# Patient Record
Sex: FEMALE | HISPANIC OR LATINO | Employment: FULL TIME | URBAN - METROPOLITAN AREA
[De-identification: names, ages, dates, MRNs, and addresses within clinical notes are randomized per-mention and may not be internally consistent; named-entity substitution may affect disease eponyms.]

---

## 2024-05-07 ENCOUNTER — OCCUPATIONAL MEDICINE (OUTPATIENT)
Dept: URGENT CARE | Facility: CLINIC | Age: 49
End: 2024-05-07
Payer: COMMERCIAL

## 2024-05-07 ENCOUNTER — HOSPITAL ENCOUNTER (EMERGENCY)
Facility: MEDICAL CENTER | Age: 49
End: 2024-05-07
Attending: EMERGENCY MEDICINE
Payer: COMMERCIAL

## 2024-05-07 VITALS
TEMPERATURE: 97.9 F | WEIGHT: 180 LBS | DIASTOLIC BLOOD PRESSURE: 70 MMHG | OXYGEN SATURATION: 98 % | RESPIRATION RATE: 12 BRPM | HEART RATE: 83 BPM | SYSTOLIC BLOOD PRESSURE: 122 MMHG

## 2024-05-07 VITALS
SYSTOLIC BLOOD PRESSURE: 115 MMHG | OXYGEN SATURATION: 98 % | TEMPERATURE: 96.8 F | BODY MASS INDEX: 28.18 KG/M2 | HEART RATE: 88 BPM | DIASTOLIC BLOOD PRESSURE: 77 MMHG | HEIGHT: 61 IN | WEIGHT: 149.25 LBS | RESPIRATION RATE: 18 BRPM

## 2024-05-07 DIAGNOSIS — R42 DIZZINESS: ICD-10-CM

## 2024-05-07 DIAGNOSIS — S06.9X9A HEAD INJURY WITH LOSS OF CONSCIOUSNESS (HCC): ICD-10-CM

## 2024-05-07 DIAGNOSIS — S06.0X1A CONCUSSION WITH LOSS OF CONSCIOUSNESS OF 30 MINUTES OR LESS, INITIAL ENCOUNTER: ICD-10-CM
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
## 2024-05-07 DIAGNOSIS — S16.1XXA STRAIN OF NECK MUSCLE, INITIAL ENCOUNTER: ICD-10-CM

## 2024-05-07 PROCEDURE — 3074F SYST BP LT 130 MM HG: CPT | Performed by: NURSE PRACTITIONER

## 2024-05-07 PROCEDURE — 99282 EMERGENCY DEPT VISIT SF MDM: CPT

## 2024-05-07 PROCEDURE — 99205 OFFICE O/P NEW HI 60 MIN: CPT | Performed by: NURSE PRACTITIONER

## 2024-05-07 PROCEDURE — 3078F DIAST BP <80 MM HG: CPT | Performed by: NURSE PRACTITIONER

## 2024-05-07 ASSESSMENT — ENCOUNTER SYMPTOMS
HEADACHES: 1
NECK PAIN: 1
DIZZINESS: 1
BLURRED VISION: 0
LOSS OF CONSCIOUSNESS: 1
NAUSEA: 1

## 2024-05-07 ASSESSMENT — PAIN DESCRIPTION - PAIN TYPE: TYPE: ACUTE PAIN

## 2024-05-08 NOTE — ED PROVIDER NOTES
ED Provider Note    Scribed for Dr. Mohamud by Junior Barajas. 5/7/2024,  8:06 PM.      CHIEF COMPLAINT  Chief Complaint   Patient presents with    T-5000 FALL     Was pulling a cable at work around 0830 today. , fell on back and hit head on on concrete. LOC for unknown amount of time. Denies thinners or vomiting. Feels nauseated. Seen a occupational health and sen to ER for further work up. Now complaining of dizziness and neck pain and numbness on both shoulder. Drove self to ER.        EXTERNAL RECORDS REVIEWED  Outpatient Notes Patient was seen at Occupational Health earlier today. Referred to come to the ED for further evaluation and higher level of care.    HPI  LIMITATION TO HISTORY   Select: Language Scottish,  Used   OUTSIDE HISTORIAN(S):  None    Alyssa Bullock is a 48 y.o. female who presents to the Emergency Department for a ground level fall onset this morning around 8:30 AM. The patient reports she was pulling a cable at work and fell onto her back, hitting her head against the concrete. Patient was seen by occupational health and was sent to the ED for further evaluation. She reports experiencing head pain, dizziness, and neck pain. Patient had nausea this morning after the incident but denies currently experiencing nausea. She reports noticing these symptoms after taking a nap after work. She reports loss of consciousness for an unknown amount of time. She denies being on blood thinners and also denies any vomiting, back pain, or vision changes.      REVIEW OF SYSTEMS  See HPI for further details. All other systems are negative.     PAST MEDICAL HISTORY   No past medical history noted.    SURGICAL HISTORY  No past surgical history noted.    FAMILY HISTORY  No family history noted.    SOCIAL HISTORY    reports that she has never smoked. She does not have any smokeless tobacco history on file. She reports that she does not drink alcohol and does not use drugs.    CURRENT  "MEDICATIONS  Home Medications       Reviewed by Chika Nelson R.N. (Registered Nurse) on 05/07/24 at 1943  Med List Status: Partial     Medication Last Dose Status        Patient Juan Taking any Medications                           ALLERGIES  No Known Allergies    PHYSICAL EXAM  VITAL SIGNS: /77   Pulse 94   Temp 35.9 °C (96.7 °F) (Temporal)   Ht 1.56 m (5' 1.42\")   Wt 67.7 kg (149 lb 4 oz)   LMP 10/18/2023 (Approximate)   SpO2 97%   BMI 27.82 kg/m²   Gen: Alert, no acute distress  HEENT: ATNC  Eyes: PERRL, EOMI, normal conjunctiva  Neck: trachea midline, bilateral paraspinal C-spine tenderness, full ROM of neck  Resp: no respiratory distress  CV: No JVD, regular rate and rhythm  Abd: non-distended  Back: no midline spinal tenderness  Ext: No deformities  Neuro: speech fluent, cranial nerves II-XII intact, GCS 15    COURSE & MEDICAL DECISION MAKING  Pertinent Labs & Imaging studies were reviewed. (See chart for details)  -----------    8:06 PM Patient seen and examined at bedside. I discussed the patient likely has a concussion and does not require additional imaging at this time. Patient had the opportunity to ask any questions. The plan for discharge was discussed with them and they were told to return for any new or worsening symptoms. She was also informed of the plans for follow up. Patient is understanding and agreeable to the plan for discharge.    INITIAL ASSESSMENT AND PLAN  Medical Decision Making: Patient presents approximate 12 hours after a nonsyncopal fall.  She does report temporary loss of consciousness followed by some nausea which has resolved.  She does have an ongoing headache but is not showing any signs of clinically significant traumatic brain injury.  Her neck pain is negative according to [place] C-spine rules.  She has full range of motion of the neck.  No evidence of unstable cervical injury, or surgical intracranial pathology.  Patient is not on any blood thinners.  " Patient was given anticipatory guidance for her concussion    ADDITIONAL PROBLEM LIST AND DISPOSITION      Escalation of care considered, and ultimately not performed: diagnostic imaging.     Barriers to care at this time, including but not limited to: Patient does not have established PCP.     Decision tools and prescription drugs considered including, but not limited to:  [place] cervical spine rules: Negative .    The patient will return for new or worsening symptoms and is stable at the time of discharge.    The patient is referred to a primary physician for blood pressure management, diabetic screening, and for all other preventative health concerns.    DISPOSITION:  Patient will be discharged home in stable condition.    FOLLOW UP:  [facility], Emergency Dept  [facility] [facility] [id number] [phone]    If symptoms worsen    Your occupational health provider    Schedule an appointment as soon as possible for a visit   If you still have symptoms tomorrow morning    FINAL IMPRESSION  1. Fall, initial encounter    2. Concussion with loss of consciousness of 30 minutes or less, initial encounter    3. Strain of neck muscle, initial encounter      IJunior (Berny), am scribing for, and in the presence of, Blake Mohamud M.D..    Electronically signed by: Junior Barajas (Berny), 5/7/2024    IBlake M.D. personally performed the services described in this documentation, as scribed by Junior Barajas in my presence, and it is both accurate and complete.    The note accurately reflects work and decisions made by me.  Blake Mohamud M.D.  5/7/2024  8:44 PM      This dictation was created using voice recognition software. The accuracy of the dictation is limited to the abilities of the software. I expect there may be some errors of grammar and possibly content. The nursing notes were reviewed and certain aspects of this information were incorporated into this note.

## 2024-05-08 NOTE — ED NOTES
"Pt discharged home, pt A&Ox4, on room air, steady gait. pt in possession of belongings. Pt provided discharge education and information pertaining to medications and follow up appointments. Pt received copy of discharge instructions and verbalized understanding. /77   Pulse 88   Temp 36 °C (96.8 °F) (Temporal)   Resp 18   Ht 1.56 m (5' 1.42\")   Wt 67.7 kg (149 lb 4 oz)   LMP 10/18/2023 (Approximate)   SpO2 98%   BMI 27.82 kg/m²     "

## 2024-05-08 NOTE — PROGRESS NOTES
Subjective:   Alyssa Bullock  is a 48 y.o. female who presents for Head Injury ( NEW Head injury)        HPI  Patient is a 48-year-old female Mohawk-speaking only  used for this encounter who presents to the urgent care for evaluation of a head injury that occurred while she was at work.  Patient states that she was pulling on a rope when she fell backwards striking her posterior aspect of her head.  She did lose consciousness for a couple of minutes.  Patient does have dizziness when she stands up with a headache.  Earlier she was experiencing nausea however this is since subsided.  She has soreness in her neck.  No numbness or tingling in the upper extremities.  Patient does not take any blood thinners.  Review of Systems   Constitutional:  Negative for malaise/fatigue.   Eyes:  Negative for blurred vision.   Gastrointestinal:  Positive for nausea.   Musculoskeletal:  Positive for neck pain.   Neurological:  Positive for dizziness, loss of consciousness and headaches.     No Known Allergies   Objective:   /70   Pulse 83   Temp 36.6 °C (97.9 °F) (Temporal)   Resp 12   Wt 81.6 kg (180 lb)   SpO2 98%   Physical Exam  Vitals and nursing note reviewed.   Constitutional:       General: She is not in acute distress.     Appearance: She is well-developed.   HENT:      Head: Normocephalic and atraumatic.        Comments: Tenderness palpation to the occipital region     Right Ear: External ear normal.      Left Ear: External ear normal.      Nose: Nose normal.      Mouth/Throat:      Mouth: Mucous membranes are moist.   Eyes:      Conjunctiva/sclera: Conjunctivae normal.   Neck:      Meningeal: Brudzinski's sign and Kernig's sign absent.   Cardiovascular:      Rate and Rhythm: Normal rate.   Pulmonary:      Effort: Pulmonary effort is normal. No respiratory distress.      Breath sounds: Normal breath sounds.   Abdominal:      General: There is no distension.   Musculoskeletal:          General: Normal range of motion.      Cervical back: Full passive range of motion without pain. Spasms and tenderness present. No bony tenderness (.er). No pain with movement.        Back:    Skin:     General: Skin is warm and dry.   Neurological:      General: No focal deficit present.      Mental Status: She is alert and oriented to person, place, and time. Mental status is at baseline. She is not disoriented.      GCS: GCS eye subscore is 4. GCS verbal subscore is 5. GCS motor subscore is 6.      Cranial Nerves: No cranial nerve deficit.      Sensory: No sensory deficit.      Gait: Gait (gait at baseline) normal.      Deep Tendon Reflexes: Reflexes are normal and symmetric.   Psychiatric:         Judgment: Judgment normal.           Assessment/Plan:   1. Head injury with loss of consciousness (HCC)    2. Dizziness  At this time, I feel the patient requires a higher level of care including closer monitoring, stat lab work and/or imaging for further evaluation for complaints of closed head injury with LOC. This has been discussed with the patient and they state agreement and understanding.  I offered the patient an ambulance ride and  the patient is declining at this time. The patient is in no acute distress upon clinic departure and will go directly to ED without delay.

## 2024-05-08 NOTE — DISCHARGE INSTRUCTIONS
You have been diagnosed with a concussion, a type of brain injury caused by a sudden impact to your head. It can also be caused by sudden movement of your brain inside your head, such as from forceful shaking. Some concussions are mild. Most people recover completely from mild concussions. But recovery may take days, weeks, or months. For some, symptoms may last even longer. Early care and monitoring are important to prevent long-term complications. You must be cleared by a physician to return to sports.     Home care    Do's and don'ts:   Ask a friend or family member to stay with you for a few days. You should not be alone until you know how the injury has affected you. Your caregiver should call 911 if he or she can´t wake you, or if you are confused.  If you have a headache, try placing a cold, damp cloth on your forehead. You may take Tylenol (acetaminophen)  Don't drink alcohol or use any recreational drugs.   Don't return to sports or any activity that could cause you to hit your head until all symptoms are gone and you have been cleared by your doctor. A second head injury before full recovery from the first one can lead to serious brain injury.  Avoid activities that require a lot of concentration or attention. This will allow your brain to rest and heal more quickly.  The best way to recover is to discuss symptoms with your healthcare provider and your family. Work closely with your healthcare provider and give your brain time to heal.    Please return to the emergency department or seek medical care if you develop:  -Severe progressive headache  -Visual changes  -Multiple episodes of vomiting  -Difficulty finding words  -Weakness in any one part of your body  -Other concerning symptoms

## 2024-05-08 NOTE — ED TRIAGE NOTES
Alyssa Bullock  48 y.o. female  Chief Complaint   Patient presents with    T-5000 FALL     Was pulling a cable at work around 0830 today. , fell on back and hit head on on concrete. LOC for unknown amount of time. Denies thinners or vomiting. Feels nauseated. Seen a occupational health and sen to ER for further work up. Now complaining of dizziness and neck pain and numbness on both shoulder. Drove self to ER.      Pt ambulatory to triage with steady gait for above complaint.     Pt is GCS 15, speaking in full sentences, follows commands and responds appropriately to questions. Resp are even and unlabored.   Pt placed in ED lobby. Pt educated on triage process. Pt encouraged to alert staff for any changes.       Vitals:    05/07/24 1930   BP: 115/73   Pulse: 84   Temp: 35.9 °C (96.7 °F)   SpO2: 98%